# Patient Record
Sex: FEMALE | URBAN - METROPOLITAN AREA
[De-identification: names, ages, dates, MRNs, and addresses within clinical notes are randomized per-mention and may not be internally consistent; named-entity substitution may affect disease eponyms.]

---

## 2020-08-10 ENCOUNTER — APPOINTMENT (OUTPATIENT)
Dept: URBAN - METROPOLITAN AREA CLINIC 274 | Age: 60
Setting detail: DERMATOLOGY
End: 2020-08-10

## 2020-08-10 DIAGNOSIS — D17 BENIGN LIPOMATOUS NEOPLASM: ICD-10-CM

## 2020-08-10 PROBLEM — D17.22 BENIGN LIPOMATOUS NEOPLASM OF SKIN AND SUBCUTANEOUS TISSUE OF LEFT ARM: Status: ACTIVE | Noted: 2020-08-10

## 2020-08-10 PROBLEM — D17.1 BENIGN LIPOMATOUS NEOPLASM OF SKIN AND SUBCUTANEOUS TISSUE OF TRUNK: Status: ACTIVE | Noted: 2020-08-10

## 2020-08-10 PROCEDURE — OTHER OBSERVATION: OTHER

## 2020-08-10 PROCEDURE — OTHER OTHER: OTHER

## 2020-08-10 PROCEDURE — OTHER OBSERVATION AND MEASURE: OTHER

## 2020-08-10 PROCEDURE — OTHER OTHER (SELF PAY): OTHER

## 2020-08-10 ASSESSMENT — LOCATION SIMPLE DESCRIPTION DERM
LOCATION SIMPLE: RIGHT UPPER BACK
LOCATION SIMPLE: LEFT UPPER ARM

## 2020-08-10 ASSESSMENT — LOCATION ZONE DERM
LOCATION ZONE: TRUNK
LOCATION ZONE: ARM

## 2020-08-10 ASSESSMENT — LOCATION DETAILED DESCRIPTION DERM
LOCATION DETAILED: RIGHT SUPERIOR UPPER BACK
LOCATION DETAILED: LEFT ANTERIOR PROXIMAL UPPER ARM

## 2020-08-10 NOTE — PROCEDURE: OBSERVATION
X Size Of Lesion In Cm (Optional): 5
Size Of Lesion: 3.0 cm
Detail Level: Detailed
Size Of Lesion In Cm (Optional): 7

## 2020-08-10 NOTE — PROCEDURE: OTHER
Other (Free Text): Excision quote: [id number] and 15191= 872.05 Other (Free Text): Excision quote: [id number] and 51527= 876.05

## 2020-08-10 NOTE — PROCEDURE: OTHER (SELF PAY)
Other (Free Text): Office visit
Detail Level: Zone
Price (Use Numbers Only, No Special Characters Or $): 79.75

## 2020-08-10 NOTE — HPI: SUBCUTANEOUS GROWTH
How Severe Is Your Growth?: moderate
Has Your Growth Been Treated?: not been treated
Is This A New Presentation, Or A Follow-Up?: Subcutaneous Growths
Additional History: Pt states she thinks they are lipomas.

## 2020-08-10 NOTE — PROCEDURE: OTHER
Other (Free Text): Excision quote: [id number] and 93746= 688.85 pt is aware lesion could possibly be under muscle and I would have to close and her see another surgeon. Other (Free Text): Excision quote: [id number] and 67611= 001.85 pt is aware lesion could possibly be under muscle and I would have to close and her see another surgeon.